# Patient Record
Sex: FEMALE | Race: WHITE | NOT HISPANIC OR LATINO
[De-identification: names, ages, dates, MRNs, and addresses within clinical notes are randomized per-mention and may not be internally consistent; named-entity substitution may affect disease eponyms.]

---

## 2023-08-14 ENCOUNTER — APPOINTMENT (OUTPATIENT)
Dept: ORTHOPEDIC SURGERY | Facility: CLINIC | Age: 63
End: 2023-08-14
Payer: COMMERCIAL

## 2023-08-14 VITALS — BODY MASS INDEX: 20.62 KG/M2 | WEIGHT: 105 LBS | HEIGHT: 60 IN

## 2023-08-14 DIAGNOSIS — S92.351A DISPLACED FRACTURE OF FIFTH METATARSAL BONE, RIGHT FOOT, INITIAL ENCOUNTER FOR CLOSED FRACTURE: ICD-10-CM

## 2023-08-14 DIAGNOSIS — M79.673 PAIN IN UNSPECIFIED FOOT: ICD-10-CM

## 2023-08-14 DIAGNOSIS — Z78.9 OTHER SPECIFIED HEALTH STATUS: ICD-10-CM

## 2023-08-14 PROBLEM — Z00.00 ENCOUNTER FOR PREVENTIVE HEALTH EXAMINATION: Status: ACTIVE | Noted: 2023-08-14

## 2023-08-14 PROCEDURE — 28470 CLTX METATARSAL FX WO MNP EA: CPT

## 2023-08-14 PROCEDURE — 99203 OFFICE O/P NEW LOW 30 MIN: CPT | Mod: 25

## 2023-08-14 PROCEDURE — L4361: CPT | Mod: RT

## 2023-08-14 PROCEDURE — 73630 X-RAY EXAM OF FOOT: CPT | Mod: RT

## 2023-08-14 NOTE — PHYSICAL EXAM
[Right] : right foot and ankle [5___] : plantar flexion 5[unfilled]/5 [2+] : dorsalis pedis pulse: 2+ [Normal] : saphenous nerve sensation normal [Mild] : mild swelling of lateral foot [] : patient ambulates without assistive device [de-identified] : limited due to pain [de-identified] : plantar flexion 30 degrees [de-identified] : inversion 15 degrees [de-identified] : eversion 15 degrees [TWNoteComboBox7] : dorsiflexion 10 degrees

## 2023-08-14 NOTE — HISTORY OF PRESENT ILLNESS
[5] : 5 [1] : 2 [Dull/Aching] : dull/aching [Rest] : rest [Meds] : meds [Walking] : walking [de-identified] : 08/14/2023: R foot Inversion injury while playing with her grandson 1 day ago. Patient felt a pop and initially was unable to bear weight.  Pain primarily in R dorsolateral foot. no treatment to date. Walking in regular shoes with w OTC wrap, which helps with pain during inversion/eversion. Pain with driving/sleeping. Has been icing and elevating, with partial relief. denies dm/tob. no prior foot probs. PMH: factor 11 deficiency [] : Post Surgical Visit: no [FreeTextEntry1] : RT foot  [FreeTextEntry3] : 8/13/23

## 2023-08-14 NOTE — ASSESSMENT
[FreeTextEntry1] : protected wb in cam boot 'ice/elevate nsaids prn advised against driving f/up 3 wks w/ foot xray

## 2023-09-07 ENCOUNTER — APPOINTMENT (OUTPATIENT)
Dept: ORTHOPEDIC SURGERY | Facility: CLINIC | Age: 63
End: 2023-09-07
Payer: COMMERCIAL

## 2023-09-07 VITALS — HEIGHT: 60 IN | WEIGHT: 105 LBS | BODY MASS INDEX: 20.62 KG/M2

## 2023-09-07 PROCEDURE — 99024 POSTOP FOLLOW-UP VISIT: CPT

## 2023-09-07 PROCEDURE — 73630 X-RAY EXAM OF FOOT: CPT | Mod: RT

## 2023-09-07 NOTE — IMAGING
[Right] : right foot [The fracture is in acceptable alignment. There is progression in healing seen] : The fracture is in acceptable alignment. There is progression in healing seen

## 2023-09-07 NOTE — HISTORY OF PRESENT ILLNESS
[5] : 5 [1] : 2 [Dull/Aching] : dull/aching [Rest] : rest [Meds] : meds [Walking] : walking [de-identified] : 08/14/2023: R foot Inversion injury while playing with her grandson 1 day ago. Patient felt a pop and initially was unable to bear weight.  Pain primarily in R dorsolateral foot. no treatment to date. Walking in regular shoes with w OTC wrap, which helps with pain during inversion/eversion. Pain with driving/sleeping. Has been icing and elevating, with partial relief. denies dm/tob. no prior foot probs. PMH: factor 11 deficiency  09/07/2023: continued pain w/ activity. walking in boot [] : Post Surgical Visit: no [FreeTextEntry1] : RT foot  [FreeTextEntry3] : 8/13/23

## 2023-09-07 NOTE — PHYSICAL EXAM
[Right] : right foot and ankle [Mild] : mild swelling of lateral foot [5___] : plantar flexion 5[unfilled]/5 [2+] : dorsalis pedis pulse: 2+ [Normal] : saphenous nerve sensation normal [] : negative anterior drawer at ankle [FreeTextEntry8] : improved [de-identified] : limited due to pain [de-identified] : plantar flexion 30 degrees [de-identified] : inversion 15 degrees [de-identified] : eversion 15 degrees [TWNoteComboBox7] : dorsiflexion 10 degrees

## 2023-09-07 NOTE — ASSESSMENT
[FreeTextEntry1] : protected wb in cam boot 'ice/elevate nsaids prn advised against driving limit activity f/up 3 wks w/ foot xray

## 2023-09-28 ENCOUNTER — APPOINTMENT (OUTPATIENT)
Dept: ORTHOPEDIC SURGERY | Facility: CLINIC | Age: 63
End: 2023-09-28
Payer: COMMERCIAL

## 2023-09-28 PROCEDURE — 73630 X-RAY EXAM OF FOOT: CPT | Mod: RT

## 2023-09-28 PROCEDURE — 99024 POSTOP FOLLOW-UP VISIT: CPT

## 2023-10-26 ENCOUNTER — APPOINTMENT (OUTPATIENT)
Dept: ORTHOPEDIC SURGERY | Facility: CLINIC | Age: 63
End: 2023-10-26
Payer: COMMERCIAL

## 2023-10-26 DIAGNOSIS — S92.351D DISPLACED FRACTURE OF FIFTH METATARSAL BONE, RIGHT FOOT, SUBSEQUENT ENCOUNTER FOR FRACTURE WITH ROUTINE HEALING: ICD-10-CM

## 2023-10-26 PROCEDURE — 73630 X-RAY EXAM OF FOOT: CPT | Mod: RT

## 2023-10-26 PROCEDURE — 99213 OFFICE O/P EST LOW 20 MIN: CPT | Mod: 24

## 2024-03-24 ENCOUNTER — NON-APPOINTMENT (OUTPATIENT)
Age: 64
End: 2024-03-24

## 2025-03-27 ENCOUNTER — NON-APPOINTMENT (OUTPATIENT)
Age: 65
End: 2025-03-27